# Patient Record
Sex: FEMALE | Race: WHITE | Employment: OTHER | URBAN - METROPOLITAN AREA
[De-identification: names, ages, dates, MRNs, and addresses within clinical notes are randomized per-mention and may not be internally consistent; named-entity substitution may affect disease eponyms.]

---

## 2018-09-28 ENCOUNTER — HOSPITAL ENCOUNTER (OUTPATIENT)
Dept: OCCUPATIONAL THERAPY | Age: 68
Setting detail: THERAPIES SERIES
Discharge: HOME | End: 2018-09-28
Payer: MEDICARE

## 2018-09-28 PROCEDURE — 97140 MANUAL THERAPY 1/> REGIONS: CPT

## 2018-09-28 PROCEDURE — 97110 THERAPEUTIC EXERCISES: CPT

## 2018-10-01 ENCOUNTER — HOSPITAL ENCOUNTER (OUTPATIENT)
Dept: OCCUPATIONAL THERAPY | Age: 68
Setting detail: THERAPIES SERIES
Discharge: HOME | End: 2018-10-01
Payer: MEDICARE

## 2018-10-01 PROCEDURE — 97530 THERAPEUTIC ACTIVITIES: CPT

## 2018-10-01 PROCEDURE — 97110 THERAPEUTIC EXERCISES: CPT

## 2018-10-05 ENCOUNTER — HOSPITAL ENCOUNTER (OUTPATIENT)
Dept: OCCUPATIONAL THERAPY | Age: 68
Setting detail: THERAPIES SERIES
Discharge: HOME | End: 2018-10-05
Payer: MEDICARE

## 2018-10-05 PROCEDURE — 97760 ORTHOTIC MGMT&TRAING 1ST ENC: CPT

## 2018-10-05 PROCEDURE — 97110 THERAPEUTIC EXERCISES: CPT

## 2018-10-05 NOTE — FLOWSHEET NOTE
[x][facility] [address]      [place] [facility] [address]       [facility] [address], [address], [address]       [address]     [phone] [phone] [phone]  ________________________________________________________________________  Occupational Therapy Daily Treatment Note    Date:  10/5/2018  Patient Name:  Humberto Soto    :  1950  Restrictions/Precautions:GENERAL, surgical protocols, fx wrist     GCODE  Diagnosis:   L colles fx with ORIF  Treatment Diagnosis:  pain and decreased ROM  Insurance/Certification information: Medicare   Referring Physician:   Dr Merlene Singh of care signed (Y/N):    Visit# / total visits: -16  Pain level: -7/10     Subjective: States piece around thumb and hand really bother her on splint. 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 Treatment Flowsheet   Right Left            Fabricated splint-volar wrist from [employer]  Formed gutter splint into mid palm and down forearm. No piece around thumb or to back of hand. States can do what she needs to with thread for that. Otherwise she would take splint off because she has to work.    Reviewed and instructed in HEP  x   AROM fingers  x   Supination with splint on  x     AROM flex and ext wrist  x     RD/UD within tolerance  x                                                                                         Interventions/Modalities used:  [x] Therapeutic Exercise   [x] Modalities:prn  [x] Therapeutic Activity    [] Ultrasound [] Elec Stimulation   [] Total Motion Release    [] Fluido [] Kinesiotaping  [] Neuromuscular Re-education   [] Ionto [] Coldpack/hotpack   [x] Instruction in HEP    Other:Splint, edema control scar management  Objective Findings: Wrist

## 2018-10-08 ENCOUNTER — HOSPITAL ENCOUNTER (OUTPATIENT)
Dept: OCCUPATIONAL THERAPY | Age: 68
Setting detail: THERAPIES SERIES
Discharge: HOME | End: 2018-10-08
Payer: MEDICARE

## 2018-10-08 PROCEDURE — 97140 MANUAL THERAPY 1/> REGIONS: CPT

## 2018-10-08 PROCEDURE — 97110 THERAPEUTIC EXERCISES: CPT

## 2018-10-08 NOTE — FLOWSHEET NOTE
[x][facility] [address]      [place] [facility] [address]       [facility] [address], [address], [address]       [address]     [phone] [phone] [phone]  ________________________________________________________________________  Occupational Therapy Daily Treatment Note    Date:  10/8/2018  Patient Name:  Parag Stewart    :  1950  Restrictions/Precautions:GENERAL, surgical protocols, fx wrist     GCODE  Diagnosis:   L colles fx with ORIF  Treatment Diagnosis:  pain and decreased ROM  Insurance/Certification information: Medicare   Referring Physician:   Dr Shaka Moreland of care signed (Y/N):    Visit# / total visits: -16  Pain level: 6-7/10     Subjective: States new splint is still too high in palm. 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 Treatment Flowsheet   Right Left            Fabricated splint-volar wrist from [employer]  Adjusted new splint in palm. Had already cut off bar in other splint at home.    Reviewed and instructed in HEP  x   AROM fingers  x   Supination with splint on  x     AROM flex and ext wrist  x     RD/UD within tolerance  x                                                                                         Interventions/Modalities used:  [x] Therapeutic Exercise   [x] Modalities:prn  [x] Therapeutic Activity    [] Ultrasound [] Elec Stimulation   [] Total Motion Release    [] Fluido [] Kinesiotaping  [] Neuromuscular Re-education   [] Ionto [] Coldpack/hotpack   [x] Instruction in HEP    Other:Splint, edema control scar management  Objective Findings: Wrist flex 45   Ext 32 Sup 60    Communication with other providers: POC to physician    Education provided to patient:Issued and instructed in HEP and wear and care of splint  Adverse Reactions to treatment:

## 2018-10-12 ENCOUNTER — HOSPITAL ENCOUNTER (OUTPATIENT)
Dept: OCCUPATIONAL THERAPY | Age: 68
Setting detail: THERAPIES SERIES
Discharge: HOME | End: 2018-10-12
Payer: MEDICARE

## 2018-10-12 PROCEDURE — 97140 MANUAL THERAPY 1/> REGIONS: CPT

## 2018-10-12 PROCEDURE — 97110 THERAPEUTIC EXERCISES: CPT

## 2018-10-12 PROCEDURE — 97022 WHIRLPOOL THERAPY: CPT

## 2018-10-12 NOTE — FLOWSHEET NOTE
[x][facility] [address]      [place] [facility] [address]       [facility], [address], [address]       [address] Ped[address]     [phone] [phone] [phone]  ________________________________________________________________________  Occupational Therapy Daily Treatment Note    Date:  10/12/2018  Patient Name:  Lyla Lopez    :  1950  Restrictions/Precautions:GENERAL, surgical protocols, fx wrist     GCODE  Diagnosis:   L colles fx with ORIF  Treatment Diagnosis:  pain and decreased ROM  Insurance/Certification information: Medicare   Referring Physician:   Dr Maribel Zaldivar of care signed (Y/N):    Visit# / total visits: -16  Pain level: 6-7/10     Subjective:  States doctor told her not to lift more than 5 pounds. States fingers swelled yesterday. Are still swollen some today. 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 Treatment Flowsheet   Right Left     fluidotherapy  x     Fabricated splint-volar wrist from [employer]  Adjusted new splint more at wrist. Had already cut off bar in other splint at home.    Reviewed and instructed in HEP  x   AROM fingers  x   Supination with splint on  x     AROM flex and ext wrist  x     RD/UD within tolerance  x     Scar massage  x   MEM for edematous at fingers  x     PROM fingers  x     digiflex 3#  x   Pinch pin 2#  x   Wrist flex and ext 1#  x   8oz hammer sup/pron  x                                        Interventions/Modalities used:  [x] Therapeutic Exercise   [x] Modalities:prn  [x] Therapeutic Activity    [] Ultrasound [] Elec Stimulation   [] Total Motion Release    [] Fluido [] Kinesiotaping  [] Neuromuscular Re-education   [] Ionto [] Coldpack/hotpack   [x] Instruction in HEP    Other:Splint, edema control scar management  Objective Findings: Wrist flex 45   Ext 34 Sup 60    Communication with other providers: POC to physician    Education provided to patient:Issued and instructed in HEP and wear and care of splint  Adverse Reactions to treatment: none    Timed Code Treatment Minutes:  65    Total Treatment Minutes:  65    Treatment/Activity Tolerance:     [x]  Patient tolerated treatment well []  Patient limited by fatique    []  Patient limited by pain []  Patient limited by other medical complications   []  Other:         Patient Requires Follow-up:  [x]  Yes  []  No    Plan: [x]  Continue per plan of care []  Alter current plan (see comments)   []  Plan of care initiated []  Hold pending MD visit []  Discharge    Plan for Next Session:      Electronically signed by:  ALEXANDRA Burgos/L, 10/12/2018, 12:37 PM

## 2018-10-19 ENCOUNTER — HOSPITAL ENCOUNTER (OUTPATIENT)
Dept: OCCUPATIONAL THERAPY | Age: 68
Setting detail: THERAPIES SERIES
Discharge: HOME | End: 2018-10-19
Payer: MEDICARE

## 2018-10-19 PROCEDURE — 97110 THERAPEUTIC EXERCISES: CPT

## 2018-10-19 PROCEDURE — 97140 MANUAL THERAPY 1/> REGIONS: CPT

## 2018-10-19 PROCEDURE — 97022 WHIRLPOOL THERAPY: CPT

## 2018-10-22 ENCOUNTER — HOSPITAL ENCOUNTER (OUTPATIENT)
Dept: OCCUPATIONAL THERAPY | Age: 68
Setting detail: THERAPIES SERIES
Discharge: HOME | End: 2018-10-22
Payer: MEDICARE

## 2018-10-22 PROCEDURE — 97022 WHIRLPOOL THERAPY: CPT

## 2018-10-22 PROCEDURE — 97110 THERAPEUTIC EXERCISES: CPT

## 2018-10-22 PROCEDURE — 97140 MANUAL THERAPY 1/> REGIONS: CPT

## 2018-10-25 PROCEDURE — G8985 CARRY GOAL STATUS: HCPCS

## 2018-10-25 PROCEDURE — G8984 CARRY CURRENT STATUS: HCPCS

## 2018-10-26 ENCOUNTER — HOSPITAL ENCOUNTER (OUTPATIENT)
Dept: OCCUPATIONAL THERAPY | Age: 68
Setting detail: THERAPIES SERIES
Discharge: HOME | End: 2018-10-26
Payer: MEDICARE

## 2018-10-26 PROCEDURE — 97140 MANUAL THERAPY 1/> REGIONS: CPT

## 2018-10-26 PROCEDURE — G8985 CARRY GOAL STATUS: HCPCS

## 2018-10-26 PROCEDURE — 97022 WHIRLPOOL THERAPY: CPT

## 2018-10-26 PROCEDURE — G8984 CARRY CURRENT STATUS: HCPCS

## 2018-10-26 PROCEDURE — 97110 THERAPEUTIC EXERCISES: CPT

## 2018-10-26 NOTE — FLOWSHEET NOTE
[x][facility] [address]      [place] [facility] [address]       [facility], [address], [address]       D[address] Ped[address]     [phone] [phone] [phone]  ________________________________________________________________________  Occupational Therapy Daily Treatment Note    Date:  10/26/2018  Patient Name:  Mack Lipoma    :  1950  Restrictions/Precautions:GENERAL, surgical protocols, fx wrist     GCODE  Diagnosis:   L colles fx with ORIF  Treatment Diagnosis:  pain and decreased ROM  Insurance/Certification information: Medicare   Referring Physician:   Dr Delmy Vu of care signed (Y/N):    Visit# / total visits: 10/12-16  Pain level: 3-/10     Subjective:  States is starting to feel like a hand again. Is automatically using to  things, turn on light, used a cheese grater. 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     Treatment Flowsheet   Right Left     fluidotherapy  x     Fabricated splint-volar wrist from [employer]     Reviewed and instructed in HEP  x   AROM fingers  x   Supination with splint on  x     AROM flex and ext wrist  x     RD/UD within tolerance  x     Scar massage  x   MEM for edematous at fingers  x     PROM fingers  x     digiflex 3#  x   Pinch pin 2#  x   Wrist flex and ext 1#  x   8oz hammer sup/pron  x     Gentle AAROM  x                                 Interventions/Modalities used:  [x] Therapeutic Exercise   [x] Modalities:prn  [x] Therapeutic Activity    [] Ultrasound [] Elec Stimulation   [] Total Motion Release    [] Fluido [] Kinesiotaping  [] Neuromuscular Re-education   [] Ionto [] Coldpack/hotpack   [x] Instruction in HEP    Other:Splint, edema control scar management  Objective Findings: Wrist flex 55   Ext 45 Sup 65 R  R87# L 29#    Communication with

## 2018-10-29 ENCOUNTER — HOSPITAL ENCOUNTER (OUTPATIENT)
Dept: OCCUPATIONAL THERAPY | Age: 68
Setting detail: THERAPIES SERIES
Discharge: HOME | End: 2018-10-29
Payer: MEDICARE

## 2018-10-29 PROCEDURE — 97140 MANUAL THERAPY 1/> REGIONS: CPT

## 2018-10-29 PROCEDURE — 97110 THERAPEUTIC EXERCISES: CPT

## 2018-10-29 PROCEDURE — 97022 WHIRLPOOL THERAPY: CPT

## 2018-10-29 NOTE — FLOWSHEET NOTE
other providers: POC to physician    Education provided to patient:Issued and instructed in HEP and wear and care of splint  Adverse Reactions to treatment: none    Timed Code Treatment Minutes:  40    Total Treatment Minutes:  60    Treatment/Activity Tolerance:     [x]  Patient tolerated treatment well []  Patient limited by fatique    []  Patient limited by pain []  Patient limited by other medical complications   []  Other:         Patient Requires Follow-up:  [x]  Yes  []  No    Plan: [x]  Continue per plan of care []  Alter current plan (see comments)   []  Plan of care initiated []  Hold pending MD visit []  Discharge    Plan for Next Session:      Electronically signed by:  ALEXANDRA Uriostegui/L, 10/29/2018, 4:50 PM

## 2018-11-02 ENCOUNTER — HOSPITAL ENCOUNTER (OUTPATIENT)
Dept: OCCUPATIONAL THERAPY | Age: 68
Setting detail: THERAPIES SERIES
Discharge: HOME | End: 2018-11-02
Payer: MEDICARE

## 2018-11-02 PROCEDURE — 97022 WHIRLPOOL THERAPY: CPT

## 2018-11-02 PROCEDURE — 97110 THERAPEUTIC EXERCISES: CPT

## 2018-11-02 PROCEDURE — 97140 MANUAL THERAPY 1/> REGIONS: CPT

## 2018-11-05 ENCOUNTER — HOSPITAL ENCOUNTER (OUTPATIENT)
Dept: OCCUPATIONAL THERAPY | Age: 68
Setting detail: THERAPIES SERIES
Discharge: HOME | End: 2018-11-05
Payer: MEDICARE

## 2018-11-05 PROCEDURE — 97110 THERAPEUTIC EXERCISES: CPT

## 2018-11-05 PROCEDURE — 97022 WHIRLPOOL THERAPY: CPT

## 2018-11-05 PROCEDURE — 97140 MANUAL THERAPY 1/> REGIONS: CPT

## 2018-11-05 NOTE — FLOWSHEET NOTE
patient:Issued and instructed in HEP and wear and care of splint  Adverse Reactions to treatment: none    Timed Code Treatment Minutes:  40    Total Treatment Minutes:  60    Treatment/Activity Tolerance:     [x]  Patient tolerated treatment well []  Patient limited by fatique    []  Patient limited by pain []  Patient limited by other medical complications   []  Other:         Patient Requires Follow-up:  [x]  Yes  []  No    Plan: [x]  Continue per plan of care []  Alter current plan (see comments)   []  Plan of care initiated []  Hold pending MD visit []  Discharge    Plan for Next Session:      Electronically signed by:  Claudell Marseille Royse,OTR/L, 11/5/2018, 4:54 PM

## 2018-11-09 ENCOUNTER — HOSPITAL ENCOUNTER (OUTPATIENT)
Dept: OCCUPATIONAL THERAPY | Age: 68
Setting detail: THERAPIES SERIES
Discharge: HOME | End: 2018-11-09
Payer: MEDICARE

## 2018-11-09 PROCEDURE — 97110 THERAPEUTIC EXERCISES: CPT

## 2018-11-09 PROCEDURE — 97140 MANUAL THERAPY 1/> REGIONS: CPT

## 2018-11-09 PROCEDURE — 97022 WHIRLPOOL THERAPY: CPT

## 2018-11-09 NOTE — FLOWSHEET NOTE
[x][facility] [address]      [place] [facility] [address]       [facility] [address], [address], [address]       [address]     [phone] [phone] [phone]  ________________________________________________________________________  Occupational Therapy Daily Treatment Note-Possible discharge-visiting physician next week    Date:  2018  Patient Name:  Aggie Bernal    :  1950  Restrictions/Precautions:GENERAL, surgical protocols, fx wrist     GCODE  Diagnosis:   L colles fx with ORIF  Treatment Diagnosis:  pain and decreased ROM  Insurance/Certification information: Medicare   Referring Physician:   Dr Flores Estrada of care signed (Y/N):    Visit# / total visits: -16  Pain level: 0/10     Subjective: To see doctor Tuesday. Pt states flexion of wrist and final flexion of fingers is most difficulty. 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     Treatment Flowsheet   Right Left     fluidotherapy  x     Fabricated splint-volar wrist from [employer]     Reviewed and instructed in HEP  x   AROM fingers  x   Supination with splint on  x     AROM flex and ext wrist  x     RD/UD within tolerance  x     Scar massage  x   MEM for edematous at fingers  x     PROM fingers  x     digiflex 5#  x   Pinch pin 4#  x   Wrist flex and ext 2#  x   16oz hammer sup/pron  x     Gentle AAROM  x                                 Interventions/Modalities used:  [x] Therapeutic Exercise   [x] Modalities:prn  [x] Therapeutic Activity    [] Ultrasound [] Elec Stimulation   [] Total Motion Release    [] Fluido [] Kinesiotaping  [] Neuromuscular Re-education   [] Ionto [] Coldpack/hotpack   [x] Instruction in HEP    Other:Splint, edema control scar